# Patient Record
Sex: FEMALE | URBAN - METROPOLITAN AREA
[De-identification: names, ages, dates, MRNs, and addresses within clinical notes are randomized per-mention and may not be internally consistent; named-entity substitution may affect disease eponyms.]

---

## 2022-04-27 ENCOUNTER — NURSE TRIAGE (OUTPATIENT)
Dept: ADMINISTRATIVE | Facility: CLINIC | Age: 62
End: 2022-04-27

## 2022-04-27 NOTE — TELEPHONE ENCOUNTER
"Citlalli calling from [place] c/o "squiggly" line to left side of foot going up to LLE, noticed when taking compression sock off approximately 15 mins ago. No pain. Citlalli stated left foot is cooler to touch than right foot. Hx of venous insufficiency. Advised pt per triage protocol to go to nearest ED now for physician sebastian. Instructed pt not to drive self and if no immediate , advised pt to call 911 now. Verbalized understanding.     Reason for Disposition   Entire foot is cool or blue in comparison to other foot    Additional Information   Negative: Followed a foot injury   Negative: Diabetes mellitus   Negative: Ankle pain is main symptom   Negative: Thigh or calf pain is main symptom    Protocols used: FOOT PAIN-A-AH      "